# Patient Record
Sex: MALE | Race: WHITE | NOT HISPANIC OR LATINO | Employment: FULL TIME | URBAN - METROPOLITAN AREA
[De-identification: names, ages, dates, MRNs, and addresses within clinical notes are randomized per-mention and may not be internally consistent; named-entity substitution may affect disease eponyms.]

---
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
## 2019-08-08 ENCOUNTER — NON-PROVIDER VISIT (OUTPATIENT)
Dept: URGENT CARE | Facility: PHYSICIAN GROUP | Age: 19
End: 2019-08-08

## 2019-08-08 DIAGNOSIS — Z02.1 PRE-EMPLOYMENT DRUG SCREENING: ICD-10-CM

## 2019-08-08 PROCEDURE — 8907 PR URINE COLLECT ONLY: Performed by: FAMILY MEDICINE

## 2020-08-10 ENCOUNTER — NON-PROVIDER VISIT (OUTPATIENT)
Dept: URGENT CARE | Facility: PHYSICIAN GROUP | Age: 20
End: 2020-08-10

## 2020-08-10 DIAGNOSIS — Z02.83 ENCOUNTER FOR DRUG SCREENING: ICD-10-CM

## 2020-08-10 PROCEDURE — 8907 PR URINE COLLECT ONLY: Performed by: PHYSICIAN ASSISTANT

## 2021-05-25 ENCOUNTER — NON-PROVIDER VISIT (OUTPATIENT)
Dept: URGENT CARE | Facility: PHYSICIAN GROUP | Age: 21
End: 2021-05-25

## 2021-05-25 DIAGNOSIS — Z02.1 PRE-EMPLOYMENT DRUG SCREENING: ICD-10-CM

## 2021-05-25 LAB
AMP AMPHETAMINE: NORMAL
COC COCAINE: NORMAL
INT CON NEG: NORMAL
INT CON POS: NORMAL
MET METHAMPHETAMINES: NORMAL
OPI OPIATES: NORMAL
PCP PHENCYCLIDINE: NORMAL
POC DRUG COMMENT 753798-OCCUPATIONAL HEALTH: NEGATIVE
THC: NORMAL

## 2021-05-25 PROCEDURE — 80305 DRUG TEST PRSMV DIR OPT OBS: CPT | Performed by: FAMILY MEDICINE

## 2021-09-08 ENCOUNTER — NON-PROVIDER VISIT (OUTPATIENT)
Dept: URGENT CARE | Facility: PHYSICIAN GROUP | Age: 21
End: 2021-09-08

## 2021-09-08 DIAGNOSIS — Z02.1 PRE-EMPLOYMENT DRUG SCREENING: ICD-10-CM

## 2021-09-08 LAB
AMP AMPHETAMINE: NORMAL
COC COCAINE: NORMAL
INT CON NEG: NORMAL
INT CON POS: NORMAL
MET METHAMPHETAMINES: NORMAL
OPI OPIATES: NORMAL
PCP PHENCYCLIDINE: NORMAL
POC DRUG COMMENT 753798-OCCUPATIONAL HEALTH: POSITIVE
THC: NORMAL

## 2021-09-08 PROCEDURE — 80305 DRUG TEST PRSMV DIR OPT OBS: CPT | Performed by: FAMILY MEDICINE

## 2023-06-12 ENCOUNTER — OFFICE VISIT (OUTPATIENT)
Dept: URGENT CARE | Facility: PHYSICIAN GROUP | Age: 23
End: 2023-06-12

## 2023-06-12 VITALS
SYSTOLIC BLOOD PRESSURE: 104 MMHG | RESPIRATION RATE: 14 BRPM | BODY MASS INDEX: 27.16 KG/M2 | HEIGHT: 65 IN | OXYGEN SATURATION: 98 % | HEART RATE: 86 BPM | WEIGHT: 163 LBS | TEMPERATURE: 98.5 F | DIASTOLIC BLOOD PRESSURE: 82 MMHG

## 2023-06-12 DIAGNOSIS — R10.9 ABDOMINAL PAIN, UNSPECIFIED ABDOMINAL LOCATION: ICD-10-CM

## 2023-06-12 LAB
APPEARANCE UR: CLEAR
BILIRUB UR STRIP-MCNC: NEGATIVE MG/DL
COLOR UR AUTO: YELLOW
GLUCOSE UR STRIP.AUTO-MCNC: NEGATIVE MG/DL
KETONES UR STRIP.AUTO-MCNC: NEGATIVE MG/DL
LEUKOCYTE ESTERASE UR QL STRIP.AUTO: NEGATIVE
NITRITE UR QL STRIP.AUTO: NEGATIVE
PH UR STRIP.AUTO: 7 [PH] (ref 5–8)
PROT UR QL STRIP: NEGATIVE MG/DL
RBC UR QL AUTO: NEGATIVE
SP GR UR STRIP.AUTO: 1.01
UROBILINOGEN UR STRIP-MCNC: NEGATIVE MG/DL

## 2023-06-12 PROCEDURE — 81002 URINALYSIS NONAUTO W/O SCOPE: CPT | Performed by: PHYSICIAN ASSISTANT

## 2023-06-12 PROCEDURE — 3074F SYST BP LT 130 MM HG: CPT | Performed by: PHYSICIAN ASSISTANT

## 2023-06-12 PROCEDURE — 99204 OFFICE O/P NEW MOD 45 MIN: CPT | Performed by: PHYSICIAN ASSISTANT

## 2023-06-12 PROCEDURE — 3079F DIAST BP 80-89 MM HG: CPT | Performed by: PHYSICIAN ASSISTANT

## 2023-06-12 ASSESSMENT — ENCOUNTER SYMPTOMS
NAUSEA: 1
BLOOD IN STOOL: 0
ABDOMINAL PAIN: 1
HEARTBURN: 0
CHILLS: 0
FEVER: 0
CONSTIPATION: 0
VOMITING: 0
DIARRHEA: 1

## 2023-06-12 NOTE — PROGRESS NOTES
"Subjective:   Antonino Sanchez is a 22 y.o. male who presents for Rib Pain (Right below L rib cage, a2smnacd )        Patient presents with concerns of left-sided abdominal pain and intermittent dysuria since early December.  States that he was living in [place] at the time of symptom onset and was evaluated twice for this.  He was subsequently diagnosed with a urinary tract infection.  The first medication failed to improve his symptoms but states that the second medication did resolve his symptoms for a period of time.  He was symptom free for approximately 2 months.  Symptoms recurred approximately 2 months ago.  Pain is constant but is mildly alleviated with rest and with Tums.  Dysuria only occurs when he \"drinks too much juice.\"  States that he feels slightly feverish and congested in the morning-this improves throughout the day.  Nausea occurs first thing in the morning but improves throughout the day.  Denies vomiting.  He is having regular bowel movements-they have been loose.  No melena or hematochezia.  He has not noticed any blood in his urine.  He denies penile discharge, testicular pain, testicular swelling, genital lesions.  His last episode of unprotected intercourse was last September.  He has not tried any other medications for symptoms.  Patient drinks alcohol on weekends and smokes cannabis nightly.      Review of Systems   Constitutional:  Negative for chills and fever (subjective, in the morning).   HENT:  Positive for congestion.    Gastrointestinal:  Positive for abdominal pain, diarrhea and nausea. Negative for blood in stool, constipation, heartburn, melena and vomiting.       PMH:  has no past medical history on file.  MEDS: No current outpatient medications on file.  ALLERGIES:   Allergies   Allergen Reactions    Shellfish Allergy Swelling     SURGHX: No past surgical history on file.  SOCHX:  reports that he has never smoked. He has never used smokeless tobacco.  FH: Family history was reviewed, " "no pertinent findings to report   Objective:   /82   Pulse 86   Temp 36.9 °C (98.5 °F) (Temporal)   Resp 14   Ht 1.651 m (5' 5\")   Wt 73.9 kg (163 lb)   SpO2 98%   BMI 27.12 kg/m²   Physical Exam  Vitals reviewed.   Constitutional:       General: He is not in acute distress.     Appearance: Normal appearance. He is well-developed. He is not toxic-appearing.   HENT:      Head: Normocephalic and atraumatic.      Right Ear: External ear normal.      Left Ear: External ear normal.      Nose: Nose normal.   Cardiovascular:      Rate and Rhythm: Normal rate and regular rhythm.   Pulmonary:      Effort: Pulmonary effort is normal. No respiratory distress.      Breath sounds: No stridor.   Abdominal:      Comments: Normoactive bowel sounds. TTP LUQ and RLQ. Pt guards these area. NTTP RUQ and Santiago's is negative. + tenderness at McBurney's point. NO CVA tenderness bilaterally.   Skin:     General: Skin is dry.   Neurological:      Comments: Alert and oriented.    Psychiatric:         Speech: Speech normal.         Behavior: Behavior normal.               Assessment/Plan:   1. Abdominal pain, unspecified abdominal location  - POCT Urinalysis  - CT-ABDOMEN-PELVIS WITH; Future    Considerations include but not limited to GERD, gastritis, IBS, UTI, ureterolithiasis, appendicitis, diverticulitis, drug side effect, malignancy. UA within normal limits-UTI seems unlikely at this time.  No hematuria, also making stone pathology less likely.  Epigastrium and right upper quadrant nontender to palpation making biliary pathology less likely.  This does not look cardiac or pulmonary to me.  Patient has pain in both the left upper and right lower quadrants on exam today.  He does guard these areas.  No rebound tenderness.  Cause unclear-differential reviewed with patient.  Discussed labs and imaging to further evaluate.  Patient would like to move forward with this, but upon scheduling states that he cannot afford the " required up front cost.    Patient agrees to go to the emergency room for further evaluation if his symptoms persist for another 24-48hrs or worsen.  In the interim we will have him stop using cannabis and also try him on Prilosec.  Red flag signs and symptoms reviewed.